# Patient Record
Sex: MALE | Race: BLACK OR AFRICAN AMERICAN | NOT HISPANIC OR LATINO
[De-identification: names, ages, dates, MRNs, and addresses within clinical notes are randomized per-mention and may not be internally consistent; named-entity substitution may affect disease eponyms.]

---

## 2021-07-07 ENCOUNTER — APPOINTMENT (OUTPATIENT)
Dept: CARDIOLOGY | Facility: CLINIC | Age: 81
End: 2021-07-07
Payer: MEDICARE

## 2021-07-07 VITALS
HEIGHT: 68 IN | WEIGHT: 196 LBS | DIASTOLIC BLOOD PRESSURE: 70 MMHG | BODY MASS INDEX: 29.7 KG/M2 | HEART RATE: 79 BPM | OXYGEN SATURATION: 98 % | RESPIRATION RATE: 16 BRPM | SYSTOLIC BLOOD PRESSURE: 120 MMHG

## 2021-07-07 DIAGNOSIS — H26.9 UNSPECIFIED CATARACT: ICD-10-CM

## 2021-07-07 DIAGNOSIS — Z82.49 FAMILY HISTORY OF ISCHEMIC HEART DISEASE AND OTHER DISEASES OF THE CIRCULATORY SYSTEM: ICD-10-CM

## 2021-07-07 DIAGNOSIS — N40.0 BENIGN PROSTATIC HYPERPLASIA WITHOUT LOWER URINARY TRACT SYMPMS: ICD-10-CM

## 2021-07-07 DIAGNOSIS — R42 DIZZINESS AND GIDDINESS: ICD-10-CM

## 2021-07-07 DIAGNOSIS — D64.9 ANEMIA, UNSPECIFIED: ICD-10-CM

## 2021-07-07 DIAGNOSIS — Z78.9 OTHER SPECIFIED HEALTH STATUS: ICD-10-CM

## 2021-07-07 DIAGNOSIS — Z86.79 PERSONAL HISTORY OF OTHER DISEASES OF THE CIRCULATORY SYSTEM: ICD-10-CM

## 2021-07-07 DIAGNOSIS — Z87.891 PERSONAL HISTORY OF NICOTINE DEPENDENCE: ICD-10-CM

## 2021-07-07 DIAGNOSIS — K21.9 GASTRO-ESOPHAGEAL REFLUX DISEASE W/OUT ESOPHAGITIS: ICD-10-CM

## 2021-07-07 DIAGNOSIS — G47.00 INSOMNIA, UNSPECIFIED: ICD-10-CM

## 2021-07-07 PROCEDURE — 99072 ADDL SUPL MATRL&STAF TM PHE: CPT

## 2021-07-07 PROCEDURE — 99244 OFF/OP CNSLTJ NEW/EST MOD 40: CPT

## 2021-07-07 PROCEDURE — 99204 OFFICE O/P NEW MOD 45 MIN: CPT

## 2021-07-07 PROCEDURE — 93000 ELECTROCARDIOGRAM COMPLETE: CPT

## 2021-07-07 NOTE — ASSESSMENT
[FreeTextEntry1] : ECG- SR at 79 BPM, LAE, J point early repolarization. \par \par Lab data 10/5/2020\par Chol    . 166\par HDL        48\par LDL        95\par Tri.        135\par Creat. 1.28\par HGB 13.9\par A1C 8.5\par \par \par Impression\par 81 year old male with the following risk factors:\par 1. HTN\par 2. + family hx of CAD\par 3. HLD\par 4. Smoking hx\par 5. DM\par \par 1. HTN seems to be reasonably controlled without meds.\par 2. HLD which is  managing with diet only. Arcus senilis present in both eyes\par 3. ECG with obvious LAE, otherwise SR.\par 4. + nystagmus on exam  which suggest that his his dizziness is probably related to inner ear issue.\par 5. No coronary symptoms or signs of HF.\par 6. Uncontrolled DM A1C  8.5\par 7. Nuclear stress from 2012 negative for ischemia. \par 8. Chronic  periodic vertigo w/o syncope. Episodes last seconds and not new. \par \par \par Plan\par 1. 24 hr holter to r/o arrhythmia. This can be scheduled when he returns from his travels.\par 2. Echo to assess cardiac structure & fxn re: LAE on ECG.\par 3. Carotid US.\par 4. BW through his PCP.\par 5.  Can stop Lotensin for now.Will monitor BP and reassess\par \par Clinical follow up  after testing is completed.

## 2021-07-07 NOTE — REASON FOR VISIT
[FreeTextEntry1] : 81 year old male presenting for cardiac evaluation of dizziness as was suggested by his ENT .\par Dr Voss\par \par Recently completed  work up for vertigo of unknown etiology.  Experiences dizziness described as a spinning sensation lasting fairly short periods of time without any obvious provocation.  No associated nausea.  No syncope near syncope or changes in vision. . \par \par Denies any chest pain, SOB, palps or edema.\par \par Personal hx of DM, HTN, HLD and + former smoker 1ppd for 30 years. Family hx includes CAD in his father who passed from a MI in his 70s\par \par Home BPs running 130s/70 , admits that he  takes his Lotensin only a few times a month. \par When he does take it it tends to bottom out his blood pressure.\par \par Exercises in the form of walking with out any exertional discomfort. \par \par HLD which is diet controlled and not on a statin. \par Exercises fairly regularly without any related symptoms.\par

## 2021-07-07 NOTE — HISTORY OF PRESENT ILLNESS
[FreeTextEntry1] : BW from his PCP will be reviewed.\par \par Did have a nuclear stress test in 2012 through his PCP's office:\par 7 METS\par Hypertensive response\par Soft attenuation artifact. \par SPECT negative for ischemia. \par

## 2021-07-21 ENCOUNTER — APPOINTMENT (OUTPATIENT)
Dept: CARDIOLOGY | Facility: CLINIC | Age: 81
End: 2021-07-21
Payer: COMMERCIAL

## 2021-07-21 PROCEDURE — ZZZZZ: CPT

## 2021-07-26 ENCOUNTER — APPOINTMENT (OUTPATIENT)
Dept: CARDIOLOGY | Facility: CLINIC | Age: 81
End: 2021-07-26
Payer: MEDICARE

## 2021-07-26 PROCEDURE — 99072 ADDL SUPL MATRL&STAF TM PHE: CPT

## 2021-07-26 PROCEDURE — 93306 TTE W/DOPPLER COMPLETE: CPT

## 2021-07-26 PROCEDURE — 93880 EXTRACRANIAL BILAT STUDY: CPT

## 2021-07-29 LAB
ALBUMIN SERPL ELPH-MCNC: 4.2 G/DL
ALP BLD-CCNC: 56 U/L
ALT SERPL-CCNC: 11 U/L
ANION GAP SERPL CALC-SCNC: 13 MMOL/L
AST SERPL-CCNC: 15 U/L
BASOPHILS # BLD AUTO: 0.03 K/UL
BASOPHILS NFR BLD AUTO: 0.5 %
BILIRUB SERPL-MCNC: 0.3 MG/DL
BUN SERPL-MCNC: 14 MG/DL
CALCIUM SERPL-MCNC: 9.9 MG/DL
CHLORIDE SERPL-SCNC: 101 MMOL/L
CHOLEST SERPL-MCNC: 228 MG/DL
CO2 SERPL-SCNC: 27 MMOL/L
CREAT SERPL-MCNC: 1.3 MG/DL
EOSINOPHIL # BLD AUTO: 0.16 K/UL
EOSINOPHIL NFR BLD AUTO: 2.9 %
GLUCOSE SERPL-MCNC: 136 MG/DL
HCT VFR BLD CALC: 40.5 %
HDLC SERPL-MCNC: 52 MG/DL
HGB BLD-MCNC: 13 G/DL
IMM GRANULOCYTES NFR BLD AUTO: 0.7 %
LDLC SERPL CALC-MCNC: 154 MG/DL
LYMPHOCYTES # BLD AUTO: 2.15 K/UL
LYMPHOCYTES NFR BLD AUTO: 38.7 %
MAN DIFF?: NORMAL
MCHC RBC-ENTMCNC: 30 PG
MCHC RBC-ENTMCNC: 32.1 GM/DL
MCV RBC AUTO: 93.3 FL
MONOCYTES # BLD AUTO: 0.52 K/UL
MONOCYTES NFR BLD AUTO: 9.4 %
NEUTROPHILS # BLD AUTO: 2.65 K/UL
NEUTROPHILS NFR BLD AUTO: 47.8 %
NONHDLC SERPL-MCNC: 176 MG/DL
PLATELET # BLD AUTO: 183 K/UL
POTASSIUM SERPL-SCNC: 5.2 MMOL/L
PROT SERPL-MCNC: 7.2 G/DL
PSA SERPL-MCNC: 4.46 NG/ML
RBC # BLD: 4.34 M/UL
RBC # FLD: 14.3 %
SODIUM SERPL-SCNC: 142 MMOL/L
TRIGL SERPL-MCNC: 110 MG/DL
WBC # FLD AUTO: 5.55 K/UL

## 2021-07-30 LAB
ESTIMATED AVERAGE GLUCOSE: 166 MG/DL
HBA1C MFR BLD HPLC: 7.4 %

## 2021-08-03 ENCOUNTER — APPOINTMENT (OUTPATIENT)
Dept: CARDIOLOGY | Facility: CLINIC | Age: 81
End: 2021-08-03
Payer: MEDICARE

## 2021-08-03 VITALS
OXYGEN SATURATION: 97 % | WEIGHT: 195 LBS | HEART RATE: 68 BPM | HEIGHT: 68 IN | RESPIRATION RATE: 16 BRPM | SYSTOLIC BLOOD PRESSURE: 125 MMHG | BODY MASS INDEX: 29.55 KG/M2 | DIASTOLIC BLOOD PRESSURE: 75 MMHG

## 2021-08-03 DIAGNOSIS — I65.29 OCCLUSION AND STENOSIS OF UNSPECIFIED CAROTID ARTERY: ICD-10-CM

## 2021-08-03 DIAGNOSIS — H81.10 BENIGN PAROXYSMAL VERTIGO, UNSPECIFIED EAR: ICD-10-CM

## 2021-08-03 PROCEDURE — 99214 OFFICE O/P EST MOD 30 MIN: CPT

## 2021-08-03 PROCEDURE — 93000 ELECTROCARDIOGRAM COMPLETE: CPT

## 2021-08-03 NOTE — REASON FOR VISIT
[FreeTextEntry1] : 81 year old male presenting for cardiac re- evaluation \par Previously had presented here with complaints of of dizziness, which have since resolved.\par \par Recently completed  work up for vertigo of unknown etiology.  Experiences dizziness described as a spinning sensation lasting fairly short periods of time without any obvious provocation.  No associated nausea.  No syncope near syncope or changes in vision. . \par \par Denies any chest pain, SOB, palps or edema.\par \par Personal hx of DM, HTN, HLD and + former smoker 1ppd for 30 years. Family hx includes CAD in his father who passed from a MI in his 70s\par \par Home BPs running 130s/70 , and per our conversation last visit is no longer taking his antihypertensive\par When he does take it it tends to bottom out his blood pressure.\par \par Exercises in the form of walking with out any exertional discomfort. \par \par HLD which is diet controlled and not on a statin. \par Exercises fairly regularly without any related symptoms.\par \par He is here to review the results of noninvasive testing including an echocardiogram, carotid study and 24-hour Holter monitor.

## 2021-08-03 NOTE — ASSESSMENT
[FreeTextEntry1] : ECG- SR at 59 BPM, LAE, J point early repolarization. \par \par Lab data 10/5/2020\par Chol    . 166\par HDL        48\par LDL        95\par Tri.        135\par Creat. 1.28\par HGB 13.9\par A1C 8.5\par \par 24-hour Holter monitor 7/21/2021:\par Average heart rate 70\par Ranges 58 to 114 bpm\par Very rare PVC and APC.\par \par Echocardiogram 7/26/2021:\par Left ventricular action fraction of 60%\par There is calcification of the mitral valve which includes the annulus and resultant mild to moderate mitral valve stenosis.\par There is a left atrial enlargement.\par Pulmonary hypertension is not documented.\par \par Carotid Doppler 7/26/2021\par  shows some mild atherosclerosis and tortuosity but no clinically significant stenosis.\par \par Impression\par 81 year old male with the following risk factors:\par 1. HTN\par 2. + family hx of CAD\par 3. HLD\par 4. Smoking hx\par 5. DM\par \par 1. HTN seems to be reasonably controlled without meds.\par 2. HLD which is  managing with diet only. Arcus senilis present in both eyes\par 3. ECG with obvious LAE, otherwise SR.\par 4.  Previous symptoms of dizziness as well as the associated nystagmus have resolved.\par 5.  No evidence of any significant carotid disease and any auscultatory findings are probably on the basis of the tortuosity.\par 6.  Moderate mitral valve stenosis which is at this point not clinically symptomatic\par 7. Nuclear stress from 2012 negative for ischemia. \par 8.  Normal left ventricular systolic function and absence of hypertensive heart disease.\par \par \par Plan\par 1.  Patient may continue to stay off of antihypertensive therapy.\par 2.  Reassured at this time that there is no evidence of any hemodynamically significant carotid disease.\par 3.  Yearly echocardiogram to reassess the mitral valve disease.\par 4.  Strict attention to dietary measures as regards his glycemic index and lipids..\par \par In the absence of any new complaints or symptoms patient may be seen here again in 6 months.\par \par Clinical follow up  after testing is completed.

## 2022-02-16 ENCOUNTER — APPOINTMENT (OUTPATIENT)
Dept: CARDIOLOGY | Facility: CLINIC | Age: 82
End: 2022-02-16
Payer: MEDICARE

## 2022-02-16 VITALS
SYSTOLIC BLOOD PRESSURE: 120 MMHG | HEART RATE: 107 BPM | HEIGHT: 68 IN | OXYGEN SATURATION: 97 % | DIASTOLIC BLOOD PRESSURE: 70 MMHG | BODY MASS INDEX: 30.31 KG/M2 | RESPIRATION RATE: 16 BRPM | WEIGHT: 200 LBS

## 2022-02-16 PROCEDURE — 99214 OFFICE O/P EST MOD 30 MIN: CPT

## 2022-02-16 PROCEDURE — 93000 ELECTROCARDIOGRAM COMPLETE: CPT

## 2022-02-16 NOTE — ASSESSMENT
[FreeTextEntry1] : ECG-  Sinus tach @ 107 BPM J point early repolarization. \par ------------------------------------------NO Statin\par Lab data----10/5/2020---7/29/21---12/2021\par Chol-------------166--------228---------223\par HDL--------------48----------52----------52\par LDL--------------95---------154--------146\par Tri--------------135---------110--------128\par Creat----------1.28---------1.30\par HGB-----------13.9---------------------14\par H1A-------------3.5----------7.4-------7.1\par \par 24-hour Holter monitor 7/21/2021:\par Average heart rate 70\par Ranges 58 to 114 bpm\par Very rare PVC and APC.\par \par Echocardiogram 7/26/2021:\par Left ventricular action fraction of 60%\par There is calcification of the mitral valve which includes the annulus and resultant mild to moderate mitral valve stenosis.\par There is a left atrial enlargement.\par Pulmonary hypertension is not documented.\par \par Carotid Doppler 7/26/2021\par  shows some mild atherosclerosis and tortuosity but no clinically significant stenosis.\par \par Impression\par 81 year old male with the following risk factors:\par 1. HTN controlled with Benazepril. \par 2. + family hx of CAD\par 3. HLD:  Arcus senilis present in both eyes. \par     Recently restarted statin which efficiacy is not reflected in his BW for  review.\par 4. Smoking hx\par 5. DM with elevated A1C.\par 6. Unprovoked SOB when sitting and MOROCHO with heavy lifting. Never any CP.\par 7. ECG today still tachy one hour after argument. \par 8. No signs of HF on exam\par 9. Nuclear stress from 2012 negative for ischemia. \par 10. 7/26/21 echo  Moderate mitral valve stenosis,  of which he is at this point not clinically symptomatic\par                              Normal left ventricular systolic function and absence of hypertensive heart disease.\par 11.Previous symptoms of dizziness as well as the associated nystagmus have resolved.\par \par \par Plan\par 1.  Agree with restarting Atorvastatin 40 mg QD. Ask that BW  in 3 months be sent for review. \par 2.  Reassured at this time that there is no evidence of any hemodynamically significant carotid disease.\par 3. Echocardiogram due this summer to reassess the mitral valve disease.\par 4. Strict attention to dietary measures as regards his glycemic index and lipids..\par 5. Exercise stress test to pursue MOROCHO/SOB.\par 6. 24 hr holter to assess heart rate.\par \par Clinical follow up  after testing.

## 2022-02-16 NOTE — HISTORY OF PRESENT ILLNESS
[FreeTextEntry1] : Did have a nuclear stress test in 2012 through his PCP's office:\par 7 METS\par Hypertensive response\par Soft attenuation artifact. \par SPECT negative for ischemia. \par \par \par

## 2022-02-16 NOTE — PHYSICAL EXAM

## 2022-02-16 NOTE — REVIEW OF SYSTEMS
[Weight Gain (___ Lbs)] : [unfilled] ~Ulb weight gain [Negative] : Heme/Lymph [FreeTextEntry3] : Right and left lateral gaze & Nystagmus

## 2022-02-16 NOTE — REASON FOR VISIT
[FreeTextEntry1] : 81 year old male presenting for cardiac re- evaluation \par Previously had presented here with complaints of of dizziness and vertigo which have since resolved.\par \par New concerns are that of SOB.  Symptoms are not necessarily exertional but have reoccurred multiple times.  He continues to be physically active but has not returned to his former exercise regimen. When carrying heavy packages there is some mild MOROCHO but he attributes this to deconditioning, never any CP.\par \par Denies any  palps or edema.\par \par Personal hx of DM, HTN, HLD and + former smoker 30 pack years. Family hx includes CAD in his father who passed from a MI in his 70s\par \par HLD started on Atorvastatin which he stopped and recently restarted.  \par \par Prior to entering the building he had an argument outdoors, upsetting him and which may be reflected on his ECG today , sinus tach. \par \par

## 2022-02-16 NOTE — ASSESSMENT
[FreeTextEntry1] : ECG-  Sinus tach @ 107 BPM J point early repolarization. \par ------------------------------------------NO Statin\par Lab data----10/5/2020---7/29/21---12/2021\par Chol-------------166--------228---------223\par HDL--------------48----------52----------52\par LDL--------------95---------154--------146\par Tri--------------135---------110--------128\par Creat----------1.28---------1.30\par HGB-----------13.9---------------------14\par G2S-------------2.5----------7.4-------7.1\par \par 24-hour Holter monitor 7/21/2021:\par Average heart rate 70\par Ranges 58 to 114 bpm\par Very rare PVC and APC.\par \par Echocardiogram 7/26/2021:\par Left ventricular action fraction of 60%\par There is calcification of the mitral valve which includes the annulus and resultant mild to moderate mitral valve stenosis.\par There is a left atrial enlargement.\par Pulmonary hypertension is not documented.\par \par Carotid Doppler 7/26/2021\par  shows some mild atherosclerosis and tortuosity but no clinically significant stenosis.\par \par Impression\par 81 year old male with the following risk factors:\par 1. HTN controlled with Benazepril. \par 2. + family hx of CAD\par 3. HLD:  Arcus senilis present in both eyes. \par     Recently restarted statin which efficiacy is not reflected in his BW for  review.\par 4. Smoking hx\par 5. DM with elevated A1C.\par 6. Unprovoked SOB when sitting and MOROCHO with heavy lifting. Never any CP.\par 7. ECG today still tachy one hour after argument. \par 8. No signs of HF on exam\par 9. Nuclear stress from 2012 negative for ischemia. \par 10. 7/26/21 echo  Moderate mitral valve stenosis,  of which he is at this point not clinically symptomatic\par                              Normal left ventricular systolic function and absence of hypertensive heart disease.\par 11.Previous symptoms of dizziness as well as the associated nystagmus have resolved.\par \par \par Plan\par 1.  Agree with restarting Atorvastatin 40 mg QD. Ask that BW  in 3 months be sent for review. \par 2.  Reassured at this time that there is no evidence of any hemodynamically significant carotid disease.\par 3. Echocardiogram due this summer to reassess the mitral valve disease.\par 4. Strict attention to dietary measures as regards his glycemic index and lipids..\par 5. Exercise stress test to pursue MOROCHO/SOB.\par 6. 24 hr holter to assess heart rate.\par \par Clinical follow up  after testing.

## 2022-02-16 NOTE — PHYSICAL EXAM

## 2022-02-24 ENCOUNTER — NON-APPOINTMENT (OUTPATIENT)
Age: 82
End: 2022-02-24

## 2022-02-25 ENCOUNTER — NON-APPOINTMENT (OUTPATIENT)
Age: 82
End: 2022-02-25

## 2022-03-10 RX ORDER — METOPROLOL SUCCINATE 25 MG/1
25 TABLET, EXTENDED RELEASE ORAL DAILY
Qty: 90 | Refills: 3 | Status: DISCONTINUED | COMMUNITY
Start: 2022-02-25 | End: 2022-03-10

## 2022-04-05 ENCOUNTER — APPOINTMENT (OUTPATIENT)
Dept: CARDIOLOGY | Facility: CLINIC | Age: 82
End: 2022-04-05

## 2022-04-08 ENCOUNTER — APPOINTMENT (OUTPATIENT)
Dept: CARDIOLOGY | Facility: CLINIC | Age: 82
End: 2022-04-08

## 2022-05-05 ENCOUNTER — APPOINTMENT (OUTPATIENT)
Dept: CARDIOLOGY | Facility: CLINIC | Age: 82
End: 2022-05-05
Payer: MEDICARE

## 2022-05-05 VITALS
BODY MASS INDEX: 30.46 KG/M2 | HEIGHT: 68 IN | RESPIRATION RATE: 16 BRPM | HEART RATE: 79 BPM | WEIGHT: 201 LBS | SYSTOLIC BLOOD PRESSURE: 140 MMHG | DIASTOLIC BLOOD PRESSURE: 70 MMHG | OXYGEN SATURATION: 97 %

## 2022-05-05 DIAGNOSIS — R00.0 TACHYCARDIA, UNSPECIFIED: ICD-10-CM

## 2022-05-05 PROCEDURE — 99215 OFFICE O/P EST HI 40 MIN: CPT

## 2022-05-05 PROCEDURE — 93000 ELECTROCARDIOGRAM COMPLETE: CPT

## 2022-05-05 RX ORDER — TAMSULOSIN HYDROCHLORIDE 0.4 MG/1
0.4 CAPSULE ORAL
Refills: 0 | Status: DISCONTINUED | COMMUNITY
End: 2022-05-05

## 2022-05-05 RX ORDER — CARVEDILOL 6.25 MG/1
6.25 TABLET, FILM COATED ORAL TWICE DAILY
Qty: 180 | Refills: 3 | Status: ACTIVE | COMMUNITY

## 2022-05-05 RX ORDER — METFORMIN HYDROCHLORIDE 500 MG/1
500 TABLET, COATED ORAL TWICE DAILY
Refills: 0 | Status: DISCONTINUED | COMMUNITY
Start: 2021-02-15 | End: 2022-05-05

## 2022-05-05 RX ORDER — ATORVASTATIN CALCIUM 40 MG/1
40 TABLET, FILM COATED ORAL
Qty: 90 | Refills: 3 | Status: ACTIVE | COMMUNITY
Start: 2022-05-05

## 2022-05-05 RX ORDER — OMEPRAZOLE 40 MG/1
40 CAPSULE, DELAYED RELEASE ORAL
Refills: 0 | Status: DISCONTINUED | COMMUNITY
End: 2022-05-05

## 2022-05-05 NOTE — ASSESSMENT
[FreeTextEntry1] : ECG-  Sinus tach @ 107 BPM J point early repolarization. \par ------------------------------------------NO Statin--ATOR 40 mg\par Lab data----10/5/2020---21---2021--3/222/22\par Chol-------------166--------228---------223------148\par HDL--------------48----------52----------52--------52\par LDL--------------95---------154--------146--------81\par Tri--------------135---------110--------128---------76\par Creat----------1.28---------1.30\par HGB-----------13.9---------------------14\par H2Y-------------5.5----------7.4-------7.1\par \par \par [facility] 3/29/22: \par Echocardiogram performed at that hospitalization demonstrated: LVEF 60 to 65%\par Severe left atrial dilatation\par Mitral valve thickening and calcification with a mean diastolic gradient of 6 consistent with moderate mitral valve stenosis\par Pulmonary artery pressures were not calculated.\par \par Chest CT demonstrated:\par No evidence of PE\par Small bilateral pleural effusions\par Groundglass opacities both lungs pulmonary edema versus multifocal pneumonia\par Mild mediastinal adenopathy. \par \par 3/15/2022   A nuclear stress test was performed (Dr. Ranjith Miller) patient exercised time: 2 minutes 45 seconds (5 METS)\par Gated ejection fraction 58%\par SPECT: No evidence of ischemia.\par EC mm ST depression APCs and PVCs noted\par Peak heart rate 157 (under 11% max predicted\par Appropriate blood pressure response reported.\par \par 24-hour Holter monitor 2021:\par Average heart rate 70\par Ranges 58 to 114 bpm\par Very rare PVC and APC.\par \par Echocardiogram 2021:\par Left ventricular action fraction of 60%\par There is calcification of the mitral valve which includes the annulus and resultant mild to moderate mitral valve stenosis.\par There is a left atrial enlargement.\par Pulmonary hypertension is not documented.\par \par Carotid Doppler 2021\par  shows some mild atherosclerosis and tortuosity but no clinically significant stenosis.\par \par Impression\par 81 year old male with the following risk factors:\par 1. HTN controlled with Benazepril. \par \par 2. + family hx of CAD\par \par 3. HLD: Much improved back on atorvastatin.  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 \par     \par 4. Smoking hx\par \par 5. DM with elevated A1C. endocrinology follow-up still pending\par \par 6.  Shortness of breath improved on chronic diuretic therapy.  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 22\par \par 7. Nuclear stress 3/15/2022 demonstrating poor exercise tolerance but is negative for ischemia. \par \par 8.. 21 echo,   Moderate mitral valve stenosis, without any significant pulmonary hypertension and currently clinically asymptomatic\par                                Normal left ventricular systolic function and absence of hypertensive heart disease.\par \par 9.  Home blood pressures running 115-120s over 60s to 70s and seemingly well controlled.\par \par Plan\par 1. Continue Atorvastatin 40 mg QD. Ask that BW be repeated through endocrinology and forwarded\par \par 2.  Reassured at this time that there is no evidence of any hemodynamically significant carotid disease.\par \par 3. Echocardiogram due this summer to reassess the mitral valve disease.\par \par 4. Strict attention to dietary measures as regards his glycemic index and lipids..\par \par 5.  Advised patient to contact me with regard to any interval worsening of symptoms such as shortness of breath chest pain palpitations or edema.\par \par 6.  Clinical follow-up in 3 months.  And bring in home blood pressure monitor for assessment

## 2022-05-05 NOTE — REASON FOR VISIT
[FreeTextEntry1] : 81 year old male presenting for cardiac re- evaluation \par \par Patient had complained of multiple episodes of nonexertional shortness of breath 2022.  He continues to be physically active but has not returned to his former exercise regimen. When carrying heavy packages there is some mild MOROCHO but he attributes this to deconditioning, never any CP.\par \par He subsequently got a separate cardiac evaluation at [facility] with Dr. Ranjith Miller.  An echocardiogram was performed demonstrating previously described mitral valve stenosis and a ADARSH was recommended.\par An exercise sestamibi stress test was performed.\par \par Prior to the ADARSH, patient was hospitalized [facility] 3/29/2022 with shortness of breath on the basis of heart failure and pneumonia.\par \par He was diuresed and treated with antibiotics with good resolution and shortness of breath has remained improved on standard diuretic therapy\par \par Denies any  palps or edema.\par Previously had presented here with complaints of of dizziness and vertigo which have since resolved\par \par Personal hx of DM, HTN, HLD and + former smoker 30 pack years. Family hx includes CAD in his father who passed from a MI in his 70s\par \par HLD: re- started on Atorvastatin which he he had stopped for a few months\par \par Patient hospitalized 3/29/2022: [facility] with increasing shortness of breath and treated for CHF and pneumonia.\par Echocardiogram performed at that hospitalization demonstrated: LVEF 60 to 65%\par Severe left atrial dilatation\par Mitral valve thickening and calcification with a mean diastolic gradient of 6 consistent with moderate mitral valve stenosis\par Pulmonary artery pressures were not calculated.\par \par Chest CT demonstrated:\par No evidence of PE\par Small bilateral pleural effusions\par Groundglass opacities both lungs pulmonary edema versus multifocal pneumonia\par Mild mediastinal adenopathy. \par \par  A nuclear stress test was performed 3/15/2022 (Dr. Ranjith Miller) patient exercised time: 2 minutes 45 seconds (5 METS)\par Gated ejection fraction 58%\par SPECT: No evidence of ischemia.\par EC mm ST depression APCs and PVCs noted\par Peak heart rate 157 (under 11% max predicted\par Appropriate blood pressure response reported.\par \par

## 2022-05-05 NOTE — PHYSICAL EXAM

## 2022-08-16 ENCOUNTER — APPOINTMENT (OUTPATIENT)
Dept: CARDIOLOGY | Facility: CLINIC | Age: 82
End: 2022-08-16

## 2022-08-16 VITALS
WEIGHT: 199 LBS | BODY MASS INDEX: 30.16 KG/M2 | OXYGEN SATURATION: 98 % | SYSTOLIC BLOOD PRESSURE: 110 MMHG | DIASTOLIC BLOOD PRESSURE: 68 MMHG | HEART RATE: 69 BPM | RESPIRATION RATE: 16 BRPM | HEIGHT: 68 IN

## 2022-08-16 PROCEDURE — 99214 OFFICE O/P EST MOD 30 MIN: CPT

## 2022-08-16 PROCEDURE — 93000 ELECTROCARDIOGRAM COMPLETE: CPT

## 2022-08-16 RX ORDER — POTASSIUM CHLORIDE 750 MG/1
10 CAPSULE, EXTENDED RELEASE ORAL
Qty: 90 | Refills: 3 | Status: ACTIVE | COMMUNITY
Start: 1900-01-01 | End: 1900-01-01

## 2022-08-16 RX ORDER — BENAZEPRIL HYDROCHLORIDE 20 MG/1
20 TABLET, FILM COATED ORAL DAILY
Qty: 90 | Refills: 1 | Status: DISCONTINUED | COMMUNITY
End: 2022-08-16

## 2022-08-17 NOTE — REASON FOR VISIT
[FreeTextEntry1] : 82  year old male presenting for cardiac re- evaluation \par \par Patient c/o nonexertional shortness of breath 2022.  He continues to be physically active but has not returned to his former exercise regimen. When carrying heavy packages there is some mild MOROCHO but he attributes this to deconditioning, never any CP.\par \par He had a separate cardiac evaluation at [facility] with Dr. Ranjith Miller.  An echocardiogram was performed demonstrating previously described mitral valve stenosis and a ADARSH was recommended.\par An exercise sestamibi stress test was performed.\par \par Prior to the ADARSH, patient was hospitalized [facility] 3/29/2022 with shortness of breath on the basis of heart failure and pneumonia.\par \par He was diuresed and treated with antibiotics with good resolution and shortness of breath has remained improved on standard diuretic therapy\par \par Denies any  palps or edema.\par Previously had presented here with complaints of of dizziness and vertigo which have since resolved\par \par Personal hx of DM, HTN, HLD and + former smoker 30 pack years. Family hx includes CAD in his father who passed from a MI in his 70s\par \par HLD: re- started on Atorvastatin which he he had stopped for a few months\par \par Patient hospitalized 3/29/2022: [facility] with increasing shortness of breath and treated for CHF and pneumonia.\par Echocardiogram performed at that hospitalization demonstrated: LVEF 60 to 65%\par Severe left atrial dilatation\par Mitral valve thickening and calcification with a mean diastolic gradient of 6 consistent with moderate mitral valve stenosis\par Pulmonary artery pressures were not calculated.\par \par Chest CT demonstrated:\par No evidence of PE\par Small bilateral pleural effusions\par Groundglass opacities both lungs pulmonary edema versus multifocal pneumonia\par Mild mediastinal adenopathy. \par \par  A nuclear stress test was performed 3/15/2022 (Dr. Ranjith Miller) patient exercised time: 2 minutes 45 seconds (5 METS)\par Gated ejection fraction 58%\par SPECT: No evidence of ischemia.\par EC mm ST depression APCs and PVCs noted\par Peak heart rate 157 (under 11% max predicted\par Appropriate blood pressure response reported.\par \par

## 2022-08-17 NOTE — REVIEW OF SYSTEMS
[Negative] : Heme/Lymph [Weight Gain (___ Lbs)] : no recent weight gain [Weight Loss (___ Lbs)] : [unfilled] ~Ulb weight loss

## 2022-08-17 NOTE — PHYSICAL EXAM

## 2022-08-17 NOTE — ASSESSMENT
[FreeTextEntry1] : ECG-  Sinus tach @ 69  BPM J point early repolarization.  APC's \par \par ------------------------------------------NO Statin--ATOR 40 mg\par Lab data----10/5/2020---21---2021--3/222/22\par Chol-------------166--------228---------223------148\par HDL--------------48----------52----------52--------52\par LDL--------------95---------154--------146--------81\par Tri--------------135---------110--------128---------76\par Creat----------1.28---------1.30\par HGB-----------13.9---------------------14\par S2A-------------4.5----------7.4-------7.1\par \par \par [facility] 3/29/22: \par Echocardiogram performed at that hospitalization demonstrated: LVEF 60 to 65%\par Severe left atrial dilatation\par Mitral valve thickening and calcification with a mean diastolic gradient of 6 consistent with moderate mitral valve stenosis\par Pulmonary artery pressures were not calculated.\par \par Chest CT demonstrated:\par No evidence of PE\par Small bilateral pleural effusions\par Groundglass opacities both lungs pulmonary edema versus multifocal pneumonia\par Mild mediastinal adenopathy. \par \par 3/15/2022   A nuclear stress test was performed (Dr. Ranjith Miller) patient exercised time: 2 minutes 45 seconds (5 METS)\par Gated ejection fraction 58%\par SPECT: No evidence of ischemia.\par EC mm ST depression APCs and PVCs noted\par Peak heart rate 157 (under 11% max predicted\par Appropriate blood pressure response reported.\par \par 24-hour Holter monitor 2021:\par Average heart rate 70\par Ranges 58 to 114 bpm\par Very rare PVC and APC.\par \par Echocardiogram 2021:\par Left ventricular action fraction of 60%\par There is calcification of the mitral valve which includes the annulus and resultant mild to moderate mitral valve stenosis.\par There is a left atrial enlargement.\par Pulmonary hypertension is not documented.\par \par Carotid Doppler 2021\par  shows some mild atherosclerosis and tortuosity but no clinically significant stenosis.\par \par Impression\par 81 year old male with the following risk factors:\par 1. HTN controlled with Benazepril. \par \par 2. + family hx of CAD\par \par 3. HLD: Much improved back on atorvastatin.  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 \par     \par 4. Smoking hx\par \par 5. DM with elevated A1C. endocrinology follow-up still pending\par \par 6.  Shortness of breath improved on chronic diuretic therapy.  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 '22\par \par 7. Nuclear stress 3/15/2022 demonstrating poor exercise tolerance but is negative for ischemia. \par \par 8.. 21 echo,   Moderate mitral valve stenosis, without any significant pulmonary hypertension and currently clinically asymptomatic\par                                Normal left ventricular systolic function and absence of hypertensive heart disease.\par \par 9.  Home blood pressures running 115-120s over 60s to 70s and seemingly well controlled.\par \par Plan\par 1. Continue Atorvastatin 40 mg QD. Ask that BW be repeated through endocrinology and forwarded\par \par 2.  Reassured at this time that there is no evidence of any hemodynamically significant carotid disease.\par \par 3. Echocardiogram due this summer to reassess the mitral valve disease.\par \par 4. Strict attention to dietary measures as regards his glycemic index and lipids..\par \par 5.  Advised patient to contact me with regard to any interval worsening of symptoms such as shortness of breath chest pain palpitations or edema.\par \par 6.  Clinical follow-up in 3 months.  And bring in home blood pressure monitor for assessment

## 2022-09-26 ENCOUNTER — APPOINTMENT (OUTPATIENT)
Dept: CARDIOLOGY | Facility: CLINIC | Age: 82
End: 2022-09-26

## 2022-09-26 PROCEDURE — 93306 TTE W/DOPPLER COMPLETE: CPT

## 2022-11-16 ENCOUNTER — APPOINTMENT (OUTPATIENT)
Dept: CARDIOLOGY | Facility: CLINIC | Age: 82
End: 2022-11-16

## 2022-11-16 VITALS
HEIGHT: 68 IN | BODY MASS INDEX: 29.86 KG/M2 | OXYGEN SATURATION: 97 % | WEIGHT: 197 LBS | RESPIRATION RATE: 16 BRPM | DIASTOLIC BLOOD PRESSURE: 80 MMHG | HEART RATE: 81 BPM | SYSTOLIC BLOOD PRESSURE: 112 MMHG

## 2022-11-16 DIAGNOSIS — N18.9 CHRONIC KIDNEY DISEASE, UNSPECIFIED: ICD-10-CM

## 2022-11-16 DIAGNOSIS — E11.9 TYPE 2 DIABETES MELLITUS W/OUT COMPLICATIONS: ICD-10-CM

## 2022-11-16 PROCEDURE — 99214 OFFICE O/P EST MOD 30 MIN: CPT

## 2022-11-16 PROCEDURE — 93000 ELECTROCARDIOGRAM COMPLETE: CPT

## 2022-11-16 RX ORDER — OMEPRAZOLE 20 MG/1
20 CAPSULE, DELAYED RELEASE ORAL
Qty: 90 | Refills: 0 | Status: DISCONTINUED | COMMUNITY
Start: 2022-06-10 | End: 2022-11-16

## 2022-11-16 RX ORDER — METFORMIN HYDROCHLORIDE 1000 MG/1
1000 TABLET, COATED ORAL TWICE DAILY
Refills: 0 | Status: DISCONTINUED | COMMUNITY
End: 2022-11-16

## 2022-11-16 RX ORDER — GLIMEPIRIDE 4 MG/1
4 TABLET ORAL TWICE DAILY
Refills: 0 | Status: DISCONTINUED | COMMUNITY
End: 2022-11-16

## 2022-11-16 NOTE — REASON FOR VISIT
[FreeTextEntry1] : 82  year old male presenting for cardiac re- evaluation \par \par Denies any overall change in his activity level.  \par No new shortness of breath, palpitations, PND, orthopnea or edema.  \par He appears a bit depressed about the recent diagnosis of stage IV prostate cancer.\par \par He continues to be physically active but has not returned to his former exercise regimen. When carrying heavy packages there is some mild MOROCHO but he attributes this to deconditioning, never any CP.\par \par He had a separate cardiac evaluation at [facility] with Dr. Ranjith Miller.  An echocardiogram was performed demonstrating previously described mitral valve stenosis and a ADARSH was recommended.\par An exercise sestamibi stress test was performed.\par \par Prior to the ADARSH, patient was hospitalized [facility] 3/29/2022 with shortness of breath on the basis of heart failure and pneumonia.\par \par He was diuresed and treated with antibiotics with good resolution and shortness of breath has remained improved on standard diuretic therapy\par \par Denies any  palps or edema.\par Previously had presented here with complaints of of dizziness and vertigo which have since resolved\par \par Personal hx of DM, HTN, HLD and + former smoker 30 pack years. Family hx includes CAD in his father who passed from a MI in his 70s\par \par HLD: re- started on Atorvastatin which he he had stopped for a few months\par \par Patient hospitalized 3/29/2022: [facility] with increasing shortness of breath and treated for CHF and pneumonia.\par Echocardiogram performed at that hospitalization demonstrated: LVEF 60 to 65%\par Severe left atrial dilatation\par Mitral valve thickening and calcification with a mean diastolic gradient of 6 consistent with moderate mitral valve stenosis\par Pulmonary artery pressures were not calculated.\par \par Chest CT demonstrated:\par No evidence of PE\par Small bilateral pleural effusions\par Groundglass opacities both lungs pulmonary edema versus multifocal pneumonia\par Mild mediastinal adenopathy. \par \par  A nuclear stress test was performed 3/15/2022 (Dr. Ranjith Miller) patient exercised time: 2 minutes 45 seconds (5 METS)\par Gated ejection fraction 58%\par SPECT: No evidence of ischemia.\par EC mm ST depression APCs and PVCs noted\par Peak heart rate 157 (under 11% max predicted\par Appropriate blood pressure response reported.\par \par

## 2022-11-16 NOTE — ASSESSMENT
[FreeTextEntry1] : ECG-normal sinus rhythm at 81.  Low voltage QRS limb leads.  J-point elevation precordial leads.  Borderline left atrial enlargement.\par \par ------------------------------------------NO Statin--ATOR 40 mg\par Lab data----10/5/2020---21---2021--3/222/22\par Chol-------------166--------228---------223------148\par HDL--------------48----------52----------52--------52\par LDL--------------95---------154--------146--------81\par Tri--------------135---------110--------128---------76\par Creat----------1.28---------1.30\par HGB-----------13.9---------------------14\par A1A-------------2.5----------7.4-------7.1\par \par Echocardiogram 2022:\par Moderate mitral valve stenosis with mean gradient 10 mmHg\par Pressure half-time 162 ms\par Aortic valve restriction\par LVH with hyperdynamic function\par Mild pulmonary hypertension\par Compared to prior study there is progression of the mitral valve stenosis.\par \par [facility] 3/29/22: \par Echocardiogram performed at that hospitalization demonstrated: LVEF 60 to 65%\par Severe left atrial dilatation\par Mitral valve thickening and calcification with a mean diastolic gradient of 6 consistent with moderate mitral valve stenosis\par Pulmonary artery pressures were not calculated.\par \par Chest CT demonstrated:\par No evidence of PE\par Small bilateral pleural effusions\par Groundglass opacities both lungs pulmonary edema versus multifocal pneumonia\par Mild mediastinal adenopathy. \par \par 3/15/2022   A nuclear stress test was performed (Dr. Ranjith Miller) patient exercised time: 2 minutes 45 seconds (5 METS)\par Gated ejection fraction 58%\par SPECT: No evidence of ischemia.\par EC mm ST depression APCs and PVCs noted\par Peak heart rate 157 (under 11% max predicted\par Appropriate blood pressure response reported.\par \par 24-hour Holter monitor 2021:\par Average heart rate 70\par Ranges 58 to 114 bpm\par Very rare PVC and APC.\par \par Echocardiogram 2021:\par Left ventricular action fraction of 60%\par There is calcification of the mitral valve which includes the annulus and resultant mild to moderate mitral valve stenosis.\par There is a left atrial enlargement.\par Pulmonary hypertension is not documented.\par \par Carotid Doppler 2021\par  shows some mild atherosclerosis and tortuosity but no clinically significant stenosis.\par \par Impression\par 82-year-old male presenting with \par 1. HTN controlled with Benazepril. \par \par 2. + family hx of CAD\par \par 3. HLD: Much improved back on atorvastatin.  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 \par     \par 4. Smoking hx\par \par 5. DM with elevated A1C. endocrinology follow-up still pending\par \par 6.  Shortness of breath improved on chronic diuretic therapy.  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 22\par \par 7. Nuclear stress 3/15/2022 demonstrating poor exercise tolerance but is negative for ischemia. \par \par 8..  Moderate mitral valve stenosis showing progression and increase in the gradient and interval development of mild pulmonary hypertension.\par       Normal left ventricular systolic function and absence of hypertensive heart disease.\par \par 9.  Recent diagnosis with metastatic prostate cancer beginning hormonal therapy and Xtandi.\par \par \par Plan\par 1. Continue Atorvastatin 40 mg QD. Ask that BW be repeated through endocrinology and forwarded\par \par 2.  Reassured at this time that there is no evidence of any hemodynamically significant carotid disease.\par \par 3. Echocardiogram to be repeated in 6 months given the progression of the mitral valve disease.\par \par 4. Strict attention to dietary measures as regards his glycemic index and lipids..\par \par 5.  Advised patient to contact me with regard to any interval worsening of symptoms such as shortness of breath chest pain palpitations or edema.\par \par 6.  Patient will monitor blood pressure more carefully given the recent use of Xtandi and report any significant surges.  Otherwise follow-up here in 3 months

## 2022-11-16 NOTE — PHYSICAL EXAM

## 2022-11-16 NOTE — REVIEW OF SYSTEMS
[Weight Gain (___ Lbs)] : no recent weight gain [Weight Loss (___ Lbs)] : [unfilled] ~Ulb weight loss [Negative] : Heme/Lymph

## 2023-02-16 ENCOUNTER — NON-APPOINTMENT (OUTPATIENT)
Age: 83
End: 2023-02-16

## 2023-02-16 ENCOUNTER — APPOINTMENT (OUTPATIENT)
Dept: CARDIOLOGY | Facility: CLINIC | Age: 83
End: 2023-02-16
Payer: MEDICARE

## 2023-02-16 VITALS
SYSTOLIC BLOOD PRESSURE: 116 MMHG | BODY MASS INDEX: 28.49 KG/M2 | HEART RATE: 74 BPM | RESPIRATION RATE: 14 BRPM | WEIGHT: 188 LBS | HEIGHT: 68 IN | DIASTOLIC BLOOD PRESSURE: 56 MMHG

## 2023-02-16 DIAGNOSIS — G62.9 POLYNEUROPATHY, UNSPECIFIED: ICD-10-CM

## 2023-02-16 PROCEDURE — 93000 ELECTROCARDIOGRAM COMPLETE: CPT

## 2023-02-16 PROCEDURE — 99214 OFFICE O/P EST MOD 30 MIN: CPT

## 2023-02-16 RX ORDER — ENZALUTAMIDE 80 MG/1
80 TABLET ORAL
Refills: 0 | Status: ACTIVE | COMMUNITY

## 2023-02-16 RX ORDER — PREGABALIN 50 MG/1
50 CAPSULE ORAL AT BEDTIME
Refills: 0 | Status: ACTIVE | COMMUNITY
Start: 2023-02-16

## 2023-02-16 RX ORDER — SEMAGLUTIDE 1.34 MG/ML
2 INJECTION, SOLUTION SUBCUTANEOUS
Refills: 0 | Status: ACTIVE | COMMUNITY
Start: 2023-02-16

## 2023-02-18 NOTE — ASSESSMENT
[FreeTextEntry1] : ECG-normal sinus rhythm at 74 bpm.  Low voltage QRS limb leads. Borderline left atrial enlargement.\par \par ------------------------------------------NO Statin--ATOR 40 mg\par Lab data----10/5/2020---21---2021--3/222/22\par Chol-------------166--------228---------223------148\par HDL--------------48----------52----------52--------52\par LDL--------------95---------154--------146--------81\par Tri--------------135---------110--------128---------76\par Creat----------1.28---------1.30\par HGB-----------13.9---------------------14\par M5S-------------8.5----------7.4-------7.1\par \par Echocardiogram 2022:\par Moderate mitral valve stenosis with mean gradient 10 mmHg\par Pressure half-time 162 ms\par Aortic valve restriction\par LVH with hyperdynamic function\par Mild pulmonary hypertension\par Compared to prior study there is progression of the mitral valve stenosis.\par \par [facility] 3/29/22: \par Echocardiogram performed at that hospitalization demonstrated: LVEF 60 to 65%\par Severe left atrial dilatation\par Mitral valve thickening and calcification with a mean diastolic gradient of 6 consistent with moderate mitral valve stenosis\par Pulmonary artery pressures were not calculated.\par \par Chest CT demonstrated:\par No evidence of PE\par Small bilateral pleural effusions\par Groundglass opacities both lungs pulmonary edema versus multifocal pneumonia\par Mild mediastinal adenopathy. \par \par 3/15/2022   A nuclear stress test was performed (Dr. Ranjith Miller) patient exercised time: 2 minutes 45 seconds (5 METS)\par Gated ejection fraction 58%\par SPECT: No evidence of ischemia.\par EC mm ST depression APCs and PVCs noted\par Peak heart rate 157 (under 11% max predicted\par Appropriate blood pressure response reported.\par \par 24-hour Holter monitor 2021:\par Average heart rate 70\par Ranges 58 to 114 bpm\par Very rare PVC and APC.\par \par Echocardiogram 2021:\par Left ventricular action fraction of 60%\par There is calcification of the mitral valve which includes the annulus and resultant mild to moderate mitral valve stenosis.\par There is a left atrial enlargement.\par Pulmonary hypertension is not documented.\par \par Carotid Doppler 2021\par  shows some mild atherosclerosis and tortuosity but no clinically significant stenosis.\par \par Impression:\par \par 1. HTN well-controlled with Benazepril and Coreg. \par \par 2. + family hx of CAD, LDL improved on Atorvastatin. Repeat labs to be done through his PMD next week. Would aim for goal LDL of less than 70. \par \par 3. Continues on diuretic therapy. No signs of HF on exam. \par \par 4. Nuclear stress 3/15/2022 demonstrating poor exercise tolerance but is negative for ischemia. \par \par 5..  Moderate mitral valve stenosis showing progression and increase in the gradient and interval development of mild pulmonary hypertension. Normal left ventricular systolic function and absence of hypertensive heart disease.\par \par Plan\par 1. Continue Atorvastatin 40 mg QD. Advised to have repeat blood work be forwarded to us. If lipids not at goal, would consider augmenting statin therapy. \par \par 2. Repeat stress testing later this year to evaluate for functional capacity and any ischemia given decreased physical activity and lack of regular exercise. \par \par 3. Repeat echocardiogram later this year to reassess the progression of the mitral valve stenosis.\par \par 4. Strict attention to dietary measures as regards his glycemic index and lipids. Advised to avoid salt in his diet. \par \par Pt knows to call if any new or worsening symptoms. In the absence of any cardiac associated symptoms clinical follow up can be made in 4 months.\par \par 6.  Patient will monitor blood pressure more carefully given the recent use of Xtandi and report any significant surges.  Otherwise follow-up here in 3 months

## 2023-02-18 NOTE — REASON FOR VISIT
[FreeTextEntry1] : BULMARO LONGORIA is a 82 year-old M presents here for cardiac follow-up. \par His medical hx is relevant for:\par -Moderate mitral stenosis\par -HTN\par -HLD\par -Stage IV prostate cancer\par -DM\par \par Family history of premature CAD

## 2023-02-18 NOTE — HISTORY OF PRESENT ILLNESS
[FreeTextEntry1] : Patient denies any new cardiac symptoms. Pt denies any chest pain, dizziness, syncope, near-syncope, SOB, edema, orthopnea or PND. Exercised regularly in the past but not in the past few months. Reports low energy levels since starting treatment with Xtandi for prostate cancer. Was recently told he had neuropathy and advised to start regular walks again. \par \par No recent labs for review. He is scheduled to see his PMD and have full set of blood work done next week. \par \par Trying to avoid salt in his diet. Admits that he can cut back more on carbohydrates. \par \par \par \par

## 2023-02-18 NOTE — PHYSICAL EXAM
[Well Developed] : well developed [Well Nourished] : well nourished [No Acute Distress] : no acute distress [Normal Conjunctiva] : normal conjunctiva [Normal Venous Pressure] : normal venous pressure [No Carotid Bruit] : no carotid bruit [Normal S1, S2] : normal S1, S2 [No Rub] : no rub [No Gallop] : no gallop [Clear Lung Fields] : clear lung fields [Good Air Entry] : good air entry [No Respiratory Distress] : no respiratory distress  [Soft] : abdomen soft [Non Tender] : non-tender [No Masses/organomegaly] : no masses/organomegaly [Normal Bowel Sounds] : normal bowel sounds [Normal Gait] : normal gait [No Edema] : no edema [No Cyanosis] : no cyanosis [No Clubbing] : no clubbing [No Varicosities] : no varicosities [No Rash] : no rash [No Skin Lesions] : no skin lesions [Moves all extremities] : moves all extremities [No Focal Deficits] : no focal deficits [Normal Speech] : normal speech [Alert and Oriented] : alert and oriented [Normal memory] : normal memory [de-identified] : 1/6 diastolic murmur

## 2023-02-18 NOTE — REVIEW OF SYSTEMS
[Feeling Fatigued] : feeling fatigued [Weight Loss (___ Lbs)] : [unfilled] ~Ulb weight loss [Negative] : Heme/Lymph [Weight Gain (___ Lbs)] : no recent weight gain

## 2023-04-21 ENCOUNTER — APPOINTMENT (OUTPATIENT)
Dept: CARDIOLOGY | Facility: CLINIC | Age: 83
End: 2023-04-21
Payer: MEDICARE

## 2023-04-21 ENCOUNTER — NON-APPOINTMENT (OUTPATIENT)
Age: 83
End: 2023-04-21

## 2023-04-21 VITALS
DIASTOLIC BLOOD PRESSURE: 70 MMHG | HEIGHT: 68 IN | HEART RATE: 71 BPM | RESPIRATION RATE: 16 BRPM | BODY MASS INDEX: 30.46 KG/M2 | OXYGEN SATURATION: 98 % | SYSTOLIC BLOOD PRESSURE: 136 MMHG | WEIGHT: 201 LBS

## 2023-04-21 DIAGNOSIS — E78.5 HYPERLIPIDEMIA, UNSPECIFIED: ICD-10-CM

## 2023-04-21 PROCEDURE — 99214 OFFICE O/P EST MOD 30 MIN: CPT

## 2023-04-21 PROCEDURE — 93000 ELECTROCARDIOGRAM COMPLETE: CPT

## 2023-04-21 RX ORDER — MONTELUKAST 10 MG/1
10 TABLET, FILM COATED ORAL DAILY
Qty: 90 | Refills: 0 | Status: ACTIVE | COMMUNITY
Start: 2023-04-21 | End: 1900-01-01

## 2023-04-21 RX ORDER — LEVOCETIRIZINE DIHYDROCHLORIDE 5 MG/1
5 TABLET ORAL DAILY
Qty: 30 | Refills: 0 | Status: ACTIVE | COMMUNITY
Start: 2023-04-21 | End: 1900-01-01

## 2023-04-21 RX ORDER — FUROSEMIDE 40 MG/1
40 TABLET ORAL DAILY
Qty: 30 | Refills: 3 | Status: ACTIVE | COMMUNITY

## 2023-04-27 DIAGNOSIS — R06.02 SHORTNESS OF BREATH: ICD-10-CM

## 2023-04-27 DIAGNOSIS — I10 ESSENTIAL (PRIMARY) HYPERTENSION: ICD-10-CM

## 2023-04-27 DIAGNOSIS — I05.0 RHEUMATIC MITRAL STENOSIS: ICD-10-CM

## 2023-04-27 NOTE — PHYSICAL EXAM
[Well Developed] : well developed [Well Nourished] : well nourished [No Acute Distress] : no acute distress [Normal Conjunctiva] : normal conjunctiva [Normal Venous Pressure] : normal venous pressure [No Carotid Bruit] : no carotid bruit [Normal S1, S2] : normal S1, S2 [No Rub] : no rub [No Gallop] : no gallop [Clear Lung Fields] : clear lung fields [Good Air Entry] : good air entry [No Respiratory Distress] : no respiratory distress  [Soft] : abdomen soft [Non Tender] : non-tender [No Masses/organomegaly] : no masses/organomegaly [Normal Bowel Sounds] : normal bowel sounds [Normal Gait] : normal gait [No Edema] : no edema [No Cyanosis] : no cyanosis [No Clubbing] : no clubbing [No Varicosities] : no varicosities [No Rash] : no rash [No Skin Lesions] : no skin lesions [Moves all extremities] : moves all extremities [No Focal Deficits] : no focal deficits [Normal Speech] : normal speech [Alert and Oriented] : alert and oriented [Normal memory] : normal memory [de-identified] : 1/6 diastolic murmur

## 2023-04-27 NOTE — REASON FOR VISIT
[FreeTextEntry1] : BULMARO LONGORIA is a 82 year-old M presents here for cardiac follow-up with concerns regarding MOROCHO for over a week. \par His medical hx is relevant for:\par -Moderate mitral stenosis\par -HTN\par -HLD\par -Stage IV prostate cancer\par -DM\par \par Family history of premature CAD

## 2023-04-27 NOTE — ASSESSMENT
[FreeTextEntry1] : ECG- normal sinus rhythm at 71 bpm.  Low voltage QRS limb leads. Borderline left atrial enlargement.\par \par ------------------------------------------NO Statin--ATOR 40 mg\par Lab data----10/5/2020---21---2021--3/222/22\par Chol-------------166--------228---------223------148\par HDL--------------48----------52----------52--------52\par LDL--------------95---------154--------146--------81\par Tri--------------135---------110--------128---------76\par Creat----------1.28---------1.30\par HGB-----------13.9---------------------14\par R8L-------------7.5----------7.4-------7.1\par \par Echocardiogram 2022:\par Moderate mitral valve stenosis with mean gradient 10 mmHg\par Pressure half-time 162 ms\par Aortic valve restriction\par LVH with hyperdynamic function\par Mild pulmonary hypertension\par Compared to prior study there is progression of the mitral valve stenosis.\par \par [facility] 3/29/22: \par Echocardiogram performed at that hospitalization demonstrated: LVEF 60 to 65%\par Severe left atrial dilatation\par Mitral valve thickening and calcification with a mean diastolic gradient of 6 consistent with moderate mitral valve stenosis\par Pulmonary artery pressures were not calculated.\par \par Chest CT demonstrated:\par No evidence of PE\par Small bilateral pleural effusions\par Groundglass opacities both lungs pulmonary edema versus multifocal pneumonia\par Mild mediastinal adenopathy. \par \par 3/15/2022   A nuclear stress test was performed (Dr. Ranjith Miller) patient exercised time: 2 minutes 45 seconds (5 METS)\par Gated ejection fraction 58%\par SPECT: No evidence of ischemia.\par EC mm ST depression APCs and PVCs noted\par Peak heart rate 157 (under 11% max predicted\par Appropriate blood pressure response reported.\par \par 24-hour Holter monitor 2021:\par Average heart rate 70\par Ranges 58 to 114 bpm\par Very rare PVC and APC.\par \par Echocardiogram 2021:\par Left ventricular action fraction of 60%\par There is calcification of the mitral valve which includes the annulus and resultant mild to moderate mitral valve stenosis.\par There is a left atrial enlargement.\par Pulmonary hypertension is not documented.\par \par Carotid Doppler 2021\par  shows some mild atherosclerosis and tortuosity but no clinically significant stenosis.\par \par Impression:\par \par 1. HTN well-controlled with Benazepril and Coreg. \par \par 2. + family hx of CAD, LDL improved on Atorvastatin.  Would aim for goal LDL of less than 70. No repeat lipids available. \par \par 3. MOROCHO with 13 lbs weight gain since February. No hypoxia and clear lung sounds.  No overt evidence of  volume overload, however, does have evidence of weight gain. \par Temporal relationship of symptoms to heightened pollen counts outdoors suggest another possibility.\par \par 4. Nuclear stress 3/15/2022 demonstrating poor exercise tolerance but is negative for ischemia. \par \par 5..  Moderate mitral valve stenosis showing progression and increase in the gradient and interval development of mild pulmonary hypertension. Normal left ventricular systolic function and absence of hypertensive heart disease.\par \par Plan\par 1. Continue Atorvastatin 40 mg QD. Repeat lipid panel and CMP. \par \par 2. Repeat stress testing to evaluate for functional capacity and any ischemia given decreased physical activity and lack of regular exercise. \par \par 3. Repeat echocardiogram to reassess cardiac function and for any progression of the mitral valve stenosis.\par \par 4. Strict attention to dietary measures as regards his glycemic index and lipids. Advised to avoid salt in his diet. \par \par 5. Increase Lasix to 60 mg daily and check BMP in 1 week. \par \par 6.  Therapeutic trial of antihistamines and Singulair.  We will check on this in 1 week\par \par Pt knows to call if any new or worsening symptoms. In the absence of any cardiac associated symptoms clinical follow up can be made after testing. \par \par

## 2023-04-27 NOTE — HISTORY OF PRESENT ILLNESS
[FreeTextEntry1] : Patient denies any new cardiac symptoms. Reports feeling well overall. Pt denies any chest pain, dizziness, syncope, near-syncope, SOB, orthopnea or PND.\par \par Has chronic mild dyspnea on exertion that has been worse over the past week. \par Admits to sometimes eating foods with salt. \par \par \par \par

## 2023-05-01 ENCOUNTER — NON-APPOINTMENT (OUTPATIENT)
Age: 83
End: 2023-05-01

## 2023-05-05 ENCOUNTER — APPOINTMENT (OUTPATIENT)
Dept: CARDIOLOGY | Facility: CLINIC | Age: 83
End: 2023-05-05

## 2023-06-08 ENCOUNTER — APPOINTMENT (OUTPATIENT)
Dept: CARDIOLOGY | Facility: CLINIC | Age: 83
End: 2023-06-08

## 2023-07-10 RX ORDER — BENAZEPRIL HYDROCHLORIDE 10 MG/1
10 TABLET, FILM COATED ORAL
Qty: 90 | Refills: 1 | Status: ACTIVE | COMMUNITY
Start: 2022-02-25 | End: 1900-01-01

## 2023-08-03 ENCOUNTER — APPOINTMENT (OUTPATIENT)
Dept: CARDIOLOGY | Facility: CLINIC | Age: 83
End: 2023-08-03

## 2023-08-04 ENCOUNTER — EMERGENCY (EMERGENCY)
Facility: HOSPITAL | Age: 83
LOS: 1 days | Discharge: HOME | End: 2023-08-04
Attending: STUDENT IN AN ORGANIZED HEALTH CARE EDUCATION/TRAINING PROGRAM | Admitting: STUDENT IN AN ORGANIZED HEALTH CARE EDUCATION/TRAINING PROGRAM
Payer: MEDICARE

## 2023-08-04 VITALS
HEART RATE: 81 BPM | OXYGEN SATURATION: 99 % | RESPIRATION RATE: 18 BRPM | SYSTOLIC BLOOD PRESSURE: 162 MMHG | DIASTOLIC BLOOD PRESSURE: 71 MMHG | TEMPERATURE: 98 F

## 2023-08-04 VITALS
RESPIRATION RATE: 18 BRPM | HEIGHT: 68 IN | TEMPERATURE: 97 F | DIASTOLIC BLOOD PRESSURE: 62 MMHG | SYSTOLIC BLOOD PRESSURE: 161 MMHG | OXYGEN SATURATION: 100 % | HEART RATE: 77 BPM | WEIGHT: 192.02 LBS

## 2023-08-04 LAB
ALBUMIN SERPL ELPH-MCNC: 3.2 G/DL — LOW (ref 3.3–5)
ALP SERPL-CCNC: 61 U/L — SIGNIFICANT CHANGE UP (ref 40–120)
ALT FLD-CCNC: 15 U/L — SIGNIFICANT CHANGE UP (ref 12–78)
ANION GAP SERPL CALC-SCNC: 9 MMOL/L — SIGNIFICANT CHANGE UP (ref 5–17)
AST SERPL-CCNC: 26 U/L — SIGNIFICANT CHANGE UP (ref 15–37)
BASOPHILS # BLD AUTO: 0.02 K/UL — SIGNIFICANT CHANGE UP (ref 0–0.2)
BASOPHILS NFR BLD AUTO: 0.3 % — SIGNIFICANT CHANGE UP (ref 0–2)
BILIRUB SERPL-MCNC: 0.6 MG/DL — SIGNIFICANT CHANGE UP (ref 0.2–1.2)
BUN SERPL-MCNC: 11 MG/DL — SIGNIFICANT CHANGE UP (ref 7–23)
CALCIUM SERPL-MCNC: 9 MG/DL — SIGNIFICANT CHANGE UP (ref 8.5–10.1)
CHLORIDE SERPL-SCNC: 103 MMOL/L — SIGNIFICANT CHANGE UP (ref 96–108)
CO2 SERPL-SCNC: 22 MMOL/L — SIGNIFICANT CHANGE UP (ref 22–31)
CREAT SERPL-MCNC: 1.4 MG/DL — HIGH (ref 0.5–1.3)
EGFR: 50 ML/MIN/1.73M2 — LOW
EOSINOPHIL # BLD AUTO: 0.05 K/UL — SIGNIFICANT CHANGE UP (ref 0–0.5)
EOSINOPHIL NFR BLD AUTO: 0.7 % — SIGNIFICANT CHANGE UP (ref 0–6)
GLUCOSE SERPL-MCNC: 189 MG/DL — HIGH (ref 70–99)
HCT VFR BLD CALC: 32.9 % — LOW (ref 39–50)
HGB BLD-MCNC: 11.3 G/DL — LOW (ref 13–17)
IMM GRANULOCYTES NFR BLD AUTO: 0.4 % — SIGNIFICANT CHANGE UP (ref 0–0.9)
LIDOCAIN IGE QN: 77 U/L — SIGNIFICANT CHANGE UP (ref 73–393)
LYMPHOCYTES # BLD AUTO: 0.84 K/UL — LOW (ref 1–3.3)
LYMPHOCYTES # BLD AUTO: 12 % — LOW (ref 13–44)
MCHC RBC-ENTMCNC: 30.6 PG — SIGNIFICANT CHANGE UP (ref 27–34)
MCHC RBC-ENTMCNC: 34.3 GM/DL — SIGNIFICANT CHANGE UP (ref 32–36)
MCV RBC AUTO: 89.2 FL — SIGNIFICANT CHANGE UP (ref 80–100)
MONOCYTES # BLD AUTO: 0.49 K/UL — SIGNIFICANT CHANGE UP (ref 0–0.9)
MONOCYTES NFR BLD AUTO: 7 % — SIGNIFICANT CHANGE UP (ref 2–14)
NEUTROPHILS # BLD AUTO: 5.55 K/UL — SIGNIFICANT CHANGE UP (ref 1.8–7.4)
NEUTROPHILS NFR BLD AUTO: 79.6 % — HIGH (ref 43–77)
NRBC # BLD: 0 /100 WBCS — SIGNIFICANT CHANGE UP (ref 0–0)
PLATELET # BLD AUTO: 156 K/UL — SIGNIFICANT CHANGE UP (ref 150–400)
POTASSIUM SERPL-MCNC: 4.1 MMOL/L — SIGNIFICANT CHANGE UP (ref 3.5–5.3)
POTASSIUM SERPL-SCNC: 4.1 MMOL/L — SIGNIFICANT CHANGE UP (ref 3.5–5.3)
PROT SERPL-MCNC: 8.2 G/DL — SIGNIFICANT CHANGE UP (ref 6–8.3)
RBC # BLD: 3.69 M/UL — LOW (ref 4.2–5.8)
RBC # FLD: 13.2 % — SIGNIFICANT CHANGE UP (ref 10.3–14.5)
SODIUM SERPL-SCNC: 134 MMOL/L — LOW (ref 135–145)
TROPONIN I, HIGH SENSITIVITY RESULT: 30 NG/L — SIGNIFICANT CHANGE UP
WBC # BLD: 6.98 K/UL — SIGNIFICANT CHANGE UP (ref 3.8–10.5)
WBC # FLD AUTO: 6.98 K/UL — SIGNIFICANT CHANGE UP (ref 3.8–10.5)

## 2023-08-04 PROCEDURE — 93005 ELECTROCARDIOGRAM TRACING: CPT

## 2023-08-04 PROCEDURE — 83690 ASSAY OF LIPASE: CPT

## 2023-08-04 PROCEDURE — 93010 ELECTROCARDIOGRAM REPORT: CPT | Mod: 76

## 2023-08-04 PROCEDURE — 36415 COLL VENOUS BLD VENIPUNCTURE: CPT

## 2023-08-04 PROCEDURE — 80053 COMPREHEN METABOLIC PANEL: CPT

## 2023-08-04 PROCEDURE — 99283 EMERGENCY DEPT VISIT LOW MDM: CPT

## 2023-08-04 PROCEDURE — 84484 ASSAY OF TROPONIN QUANT: CPT

## 2023-08-04 PROCEDURE — 85025 COMPLETE CBC W/AUTO DIFF WBC: CPT

## 2023-08-04 PROCEDURE — 99285 EMERGENCY DEPT VISIT HI MDM: CPT | Mod: FS

## 2023-08-04 RX ORDER — ONDANSETRON 8 MG/1
4 TABLET, FILM COATED ORAL ONCE
Refills: 0 | Status: COMPLETED | OUTPATIENT
Start: 2023-08-04 | End: 2023-08-04

## 2023-08-04 RX ORDER — ONDANSETRON 8 MG/1
1 TABLET, FILM COATED ORAL
Qty: 15 | Refills: 0
Start: 2023-08-04 | End: 2023-08-08

## 2023-08-04 RX ORDER — ONDANSETRON 8 MG/1
4 TABLET, FILM COATED ORAL ONCE
Refills: 0 | Status: DISCONTINUED | OUTPATIENT
Start: 2023-08-04 | End: 2023-08-07

## 2023-08-04 RX ADMIN — ONDANSETRON 4 MILLIGRAM(S): 8 TABLET, FILM COATED ORAL at 13:34

## 2023-08-04 NOTE — ED PROVIDER NOTE - OBJECTIVE STATEMENT
Patient is a 83-year-old male with past medical history of of diabetes hypertension hyperlipidemia CHF brought in by EMS from endocrinologist for nausea.  Patient states he was admitted to [facility] for hyperglycemia and discharged 2 days ago.  Patient states has been having nausea for the last few weeks since Ozempic dosage was increased patient states he last vomited 2 days ago.  Patient denies any abdominal pain fever chills urinary symptoms chest pain shortness of breath.  Patient given Zofran by EMS and symptoms improved.  Patient states his endocrinologist sent a new prescription for another medication and will no longer be on Ozempic.

## 2023-08-04 NOTE — ED PROVIDER NOTE - NS ED ATTENDING STATEMENT MOD
This was a shared visit with the LEONIDAS. I reviewed and verified the documentation and independently performed the documented:

## 2023-08-04 NOTE — ED ADULT NURSE NOTE - CHIEF COMPLAINT QUOTE
Patient A/Ox4 with clear speech. BIBA from endocrinology office for nausea. Patient believes it is related to changes in dose of Ozempic. L FA 20G placed by EMS and Zofran given PTA. Denies ABD pain.

## 2023-08-04 NOTE — ED ADULT NURSE NOTE - NSFALLUNIVINTERV_ED_ALL_ED
Bed/Stretcher in lowest position, wheels locked, appropriate side rails in place/Call bell, personal items and telephone in reach/Instruct patient to call for assistance before getting out of bed/chair/stretcher/Non-slip footwear applied when patient is off stretcher/[place] to call system/Physically safe environment - no spills, clutter or unnecessary equipment/Purposeful proactive rounding/Room/bathroom lighting operational, light cord in reach

## 2023-08-04 NOTE — ED ADULT TRIAGE NOTE - CHIEF COMPLAINT QUOTE
Patient A/Ox4 with clear speech. BIBA from home for nausea. Patient believes it is related to changes in dose of Ozempic. L FA 20G placed by EMS and Zofran given PTA. Denies ABD pain. Patient A/Ox4 with clear speech. BIBA from endocrinology office for nausea. Patient believes it is related to changes in dose of Ozempic. L FA 20G placed by EMS and Zofran given PTA. Denies ABD pain.

## 2023-08-04 NOTE — ED ADULT NURSE NOTE - OBJECTIVE STATEMENT
Pt is AOX4.. BIBA from endocrinology office for nausea. Pt has c/o nausea related to changes in dose of Ozempic. L FA 20G placed by EMS and Zofran given PTA. Denies ABD pain. Denies HA/SOB/CP. Denies dizziness. Pt states that his glucose has been not stable due to medication change and continues to feel nauseous and without appetite. Pending lab and radiology orders.

## 2023-08-04 NOTE — ED PROVIDER NOTE - NSFOLLOWUPINSTRUCTIONS_ED_ALL_ED_FT
Follow up with endocrinologist and start new diabetes medications  return to er for any worsening symptoms     Nausea, Adult  Nausea is the feeling of having an upset stomach or that you are about to vomit. Nausea on its own is not usually a serious concern, but it may be an early sign of a more serious medical problem. As nausea gets worse, it can lead to vomiting. If vomiting develops, or if you are not able to drink enough fluids, you are at risk of becoming dehydrated.    Dehydration can make you tired and thirsty, cause you to have a dry mouth, and decrease how often you urinate. Older adults and people with other diseases or a weak disease-fighting system (immune system) are at higher risk for dehydration. The main goals of treating your nausea are:  To relieve your nausea.  To limit repeated nausea episodes.  To prevent vomiting and dehydration.  Follow these instructions at home:  Watch your symptoms for any changes. Tell your health care provider about them.    Eating and drinking    A bottle of clear fruit juice and glass of water.   A sign showing that a person should not drink alcohol.  Take an oral rehydration solution (ORS). This is a drink that is sold at pharmacies and retail stores.  Drink clear fluids slowly and in small amounts as you are able. Clear fluids include water, ice chips, low-calorie sports drinks, and fruit juice that has water added (diluted fruit juice).  Eat bland, easy-to-digest foods in small amounts as you are able. These foods include bananas, applesauce, rice, lean meats, toast, and crackers.  Avoid drinking fluids that contain a lot of sugar or caffeine, such as energy drinks, sports drinks, and soda.  Avoid alcohol.  Avoid spicy or fatty foods.  General instructions    Take over-the-counter and prescription medicines only as told by your health care provider.  Rest at home while you recover.  Drink enough fluid to keep your urine pale yellow.  Breathe slowly and deeply when you feel nauseous.  Avoid smelling things that have strong odors.  Wash your hands often using soap and water for at least 20 seconds. If soap and water are not available, use hand .  Make sure that everyone in your household washes their hands well and often.  Keep all follow-up visits. This is important.  Contact a health care provider if:  Your nausea gets worse.  Your nausea does not go away after two days.  You vomit multiple times.  You cannot drink fluids without vomiting.  You have any of the following:  New symptoms.  A fever.  A headache.  Muscle cramps.  A rash.  Pain while urinating.  You feel light-headed or dizzy.  Get help right away if:  You have pain in your chest, neck, arm, or jaw.  You feel extremely weak or you faint.  You have vomit that is bright red or looks like coffee grounds.  You have bloody or black stools (feces) or stools that look like tar.  You have a severe headache, a stiff neck, or both.  You have severe pain, cramping, or bloating in your abdomen.  You have difficulty breathing or are breathing very quickly.  Your heart is beating very quickly.  Your skin feels cold and clammy.  You feel confused.  You have signs of dehydration, such as:  Dark urine, very little urine, or no urine.  Cracked lips.  Dry mouth.  Sunken eyes.  Sleepiness.  Weakness.  These symptoms may be an emergency. Get help right away. Call 911.  Do not wait to see if the symptoms will go away.  Do not drive yourself to the hospital.  Summary  Nausea is the feeling that you have an upset stomach or that you are about to vomit. Nausea on its own is not usually a serious concern, but it may be an early sign of a more serious medical problem.  If vomiting develops, or if you are not able to drink enough fluids, you are at risk of becoming dehydrated.  Follow recommendations for eating and drinking and take over-the-counter and prescription medicines only as told by your health care provider.  Contact a health care provider right away if your symptoms worsen or you have new symptoms.  Keep all follow-up visits. This is important.  This information is not intended to replace advice given to you by your health care provider. Make sure you discuss any questions you have with your health care provider.

## 2023-08-04 NOTE — ED PROVIDER NOTE - CLINICAL SUMMARY MEDICAL DECISION MAKING FREE TEXT BOX
83-year-old male with past medical history of of diabetes hypertension hyperlipidemia CHF brought in by EMS from endocrinologist for nausea.  Patient states he was admitted to [facility] for hyperglycemia and discharged 2 days ago.  Patient states has been having nausea for the last few weeks since Ozempic dosage was increased (from .25>.5>1) patient states he last vomited 2 days ago.  Patient denies any abdominal pain fever chills urinary symptoms chest pain shortness of breath.  Patient given Zofran by EMS and symptoms improved.  Patient states his endocrinologist sent a new prescription for another medication and will no longer be on Ozempic.  pt well appearing, abd nontender, will check basic labs, ozempic R[facility] can send rx for zofran

## 2023-08-04 NOTE — ED PROVIDER NOTE - PATIENT PORTAL LINK FT
You can access the FollowMyHealth Patient Portal offered by [facility] by registering at the following website: [email or web address]. By joining [employer]’s FollowMyHealth portal, you will also be able to view your health information using other applications (apps) compatible with our system.

## 2023-08-16 ENCOUNTER — APPOINTMENT (OUTPATIENT)
Dept: CARDIOLOGY | Facility: CLINIC | Age: 83
End: 2023-08-16